# Patient Record
Sex: FEMALE | URBAN - METROPOLITAN AREA
[De-identification: names, ages, dates, MRNs, and addresses within clinical notes are randomized per-mention and may not be internally consistent; named-entity substitution may affect disease eponyms.]

---

## 2020-11-06 ENCOUNTER — OFFICE VISIT (OUTPATIENT)
Dept: URBAN - METROPOLITAN AREA CLINIC 68 | Facility: CLINIC | Age: 70
End: 2020-11-06
Payer: MEDICARE

## 2020-11-06 DIAGNOSIS — H25.12 AGE-RELATED NUCLEAR CATARACT, LEFT EYE: ICD-10-CM

## 2020-11-06 PROCEDURE — 92134 CPTRZ OPH DX IMG PST SGM RTA: CPT | Performed by: OPHTHALMOLOGY

## 2020-11-06 PROCEDURE — 92014 COMPRE OPH EXAM EST PT 1/>: CPT | Performed by: OPHTHALMOLOGY

## 2020-11-06 ASSESSMENT — INTRAOCULAR PRESSURE
OS: 20
OD: 19

## 2020-11-06 NOTE — IMPRESSION/PLAN
Impression: Age-related nuclear cataract, left eye: H25.12. Plan: Observe. D/w patient future cataract surgery at their discretion.

## 2020-11-06 NOTE — IMPRESSION/PLAN
Impression: Vitreous degeneration, bilateral: H43.813. Plan: No retinal breaks were identified on exam.  The findings were discussed with the patient. The signs and symptoms of a retinal tear and detachment were again reviewed with the patient. The patient was advised to return if they noted any new floaters, flashing lights or a shadow in their vision.

## 2020-11-06 NOTE — IMPRESSION/PLAN
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 OCT OU- improving IRF OD, No IRF/SRF OS, CMT /219
s/p AV OD 9/11/20
s/p focal OD 9/25/20 Plan: RVO component? Improving inferior fluid, fortunately threatening but not involving fovea. RBAC's of treat prn vs standing vs ext d/w patient --> elects prn = obs 1m OCT OU re-eval Avastin OD

## 2020-12-04 ENCOUNTER — OFFICE VISIT (OUTPATIENT)
Dept: URBAN - METROPOLITAN AREA CLINIC 68 | Facility: CLINIC | Age: 70
End: 2020-12-04
Payer: MEDICARE

## 2020-12-04 DIAGNOSIS — H43.813 VITREOUS DEGENERATION, BILATERAL: ICD-10-CM

## 2020-12-04 DIAGNOSIS — H40.1134 PRIMARY OPEN-ANGLE GLAUCOMA, BILATERAL, INDETERMINATE STAGE: ICD-10-CM

## 2020-12-04 PROCEDURE — 99213 OFFICE O/P EST LOW 20 MIN: CPT | Performed by: OPHTHALMOLOGY

## 2020-12-04 PROCEDURE — 99024 POSTOP FOLLOW-UP VISIT: CPT | Performed by: OPHTHALMOLOGY

## 2020-12-04 PROCEDURE — 92134 CPTRZ OPH DX IMG PST SGM RTA: CPT | Performed by: OPHTHALMOLOGY

## 2020-12-04 ASSESSMENT — INTRAOCULAR PRESSURE
OS: 20
OD: 24

## 2020-12-04 NOTE — IMPRESSION/PLAN
Impression: Primary open-angle glaucoma, bilateral, indeterminate stage: H40.1134. Plan: Family History and difference in c/d between OD and OS. Also borderline IOP. Rec eval.  D/w patient. Will refer.

## 2020-12-04 NOTE — IMPRESSION/PLAN
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 OCT OU- improving IRF OD, No IRF/SRF OS, CMT /219
s/p AV OD 9/11/20
s/p focal OD 9/25/20 Plan: RVO component? Improving/stable inferior fluid, fortunately threatening but not involving fovea. RBAC's of treat prn vs standing vs ext d/w patient --> elects prn = obs 2m OCT OU re-eval Avastin OD

## 2021-02-01 ENCOUNTER — OFFICE VISIT (OUTPATIENT)
Dept: URBAN - METROPOLITAN AREA CLINIC 71 | Facility: CLINIC | Age: 71
End: 2021-02-01
Payer: MEDICARE

## 2021-02-01 DIAGNOSIS — Z96.1 PRESENCE OF INTRAOCULAR LENS: ICD-10-CM

## 2021-02-01 DIAGNOSIS — E11.3313 TYPE 2 DIAB WITH MODERATE NONP RTNOP WITH MACULAR EDEMA, BI: ICD-10-CM

## 2021-02-01 DIAGNOSIS — H40.013 OPEN ANGLE WITH BORDERLINE FINDINGS, LOW RISK, BILATERAL: Primary | ICD-10-CM

## 2021-02-01 DIAGNOSIS — H26.491 OTHER SECONDARY CATARACT, RIGHT EYE: ICD-10-CM

## 2021-02-01 PROCEDURE — 92133 CPTRZD OPH DX IMG PST SGM ON: CPT | Performed by: OPHTHALMOLOGY

## 2021-02-01 PROCEDURE — 99203 OFFICE O/P NEW LOW 30 MIN: CPT | Performed by: OPHTHALMOLOGY

## 2021-02-01 ASSESSMENT — INTRAOCULAR PRESSURE
OS: 21
OD: 22

## 2021-02-01 NOTE — IMPRESSION/PLAN
Impression: Open angle with borderline findings, low risk, bilateral: H40.013. OCT ordered today. Plan: Patient is glaucoma suspect due to slightly elevated IOP, cupping of optic nerve and positive family history. Optic nerves appear healthy on OCT and physical exam. 
No recommendation for use of drops at this time. Will have patient return in 3 months for IOP and VF testing.

## 2021-02-01 NOTE — IMPRESSION/PLAN
Impression: Vitreous degeneration, bilateral: H43.813. Stable. No holes or tears seen today. Plan: Patient instructed to call the office immediately if any symptoms of retinal detachment occur.

## 2021-02-01 NOTE — IMPRESSION/PLAN
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  Plan: Keep appointments with Dr. No Lopes

## 2021-02-11 ENCOUNTER — OFFICE VISIT (OUTPATIENT)
Dept: URBAN - METROPOLITAN AREA CLINIC 68 | Facility: CLINIC | Age: 71
End: 2021-02-11
Payer: MEDICARE

## 2021-02-11 PROCEDURE — 92134 CPTRZ OPH DX IMG PST SGM RTA: CPT | Performed by: OPHTHALMOLOGY

## 2021-02-11 PROCEDURE — 99214 OFFICE O/P EST MOD 30 MIN: CPT | Performed by: OPHTHALMOLOGY

## 2021-02-11 ASSESSMENT — INTRAOCULAR PRESSURE
OD: 19
OS: 17

## 2021-02-11 NOTE — IMPRESSION/PLAN
Impression: Type 2 diab with moderate nonp rtnop with macular edema, bi: [id number]. OCT OU- inferior IRF OD, No IRF/SRF OS, CMT /216
s/p AV OD 9/11/20
s/p focal OD 9/25/20 Plan: RVO component? Improving/stable inferior fluid, fortunately threatening but not involving fovea. 

RBAC's of treat prn vs standing vs ext d/w patient --> elects prn = obs


3-4m OCT OU re-eval Avastin OD

## 2021-02-11 NOTE — IMPRESSION/PLAN
Impression: Primary open-angle glaucoma, bilateral, indeterminate stage: H40.1134. Plan: Family History and difference in c/d between OD and OS. Also borderline IOP. Rec eval.  D/w patient.   Followed by Dr. Oly Mackey

## 2021-05-21 ENCOUNTER — OFFICE VISIT (OUTPATIENT)
Dept: URBAN - METROPOLITAN AREA CLINIC 68 | Facility: CLINIC | Age: 71
End: 2021-05-21
Payer: MEDICARE

## 2021-05-21 PROCEDURE — 92134 CPTRZ OPH DX IMG PST SGM RTA: CPT | Performed by: OPHTHALMOLOGY

## 2021-05-21 PROCEDURE — 99213 OFFICE O/P EST LOW 20 MIN: CPT | Performed by: OPHTHALMOLOGY

## 2021-05-21 ASSESSMENT — INTRAOCULAR PRESSURE
OS: 13
OD: 20

## 2021-05-21 NOTE — IMPRESSION/PLAN
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 OCT OU- inferior and CI IRF OD, No IRF/SRF OS,  / 218 
s/p AV OD 9/11/20
s/p focal OD 9/25/20 Plan: RVO component? Stable inferior fluid, fortunately threatening but not involving fovea. RBAC's d/w patient = elects focal laser OD to help stabilize edema

focal laser OD within 1m then 3-4m OCT OU re-eval Avastin OD

## 2021-05-21 NOTE — IMPRESSION/PLAN
Impression: Primary open-angle glaucoma, bilateral, indeterminate stage: H40.1134. Plan: Family History and difference in c/d between OD and OS. Also borderline IOP. Rec eval.  D/w patient.   Followed by Dr. Antonia Reid

## 2021-07-16 ENCOUNTER — PROCEDURE (OUTPATIENT)
Dept: URBAN - METROPOLITAN AREA CLINIC 68 | Facility: CLINIC | Age: 71
End: 2021-07-16
Payer: MEDICARE

## 2021-07-16 PROCEDURE — 67210 TREATMENT OF RETINAL LESION: CPT | Performed by: OPHTHALMOLOGY

## 2021-07-16 ASSESSMENT — INTRAOCULAR PRESSURE
OD: 14
OS: 15

## 2021-10-22 ENCOUNTER — OFFICE VISIT (OUTPATIENT)
Dept: URBAN - METROPOLITAN AREA CLINIC 68 | Facility: CLINIC | Age: 71
End: 2021-10-22
Payer: MEDICARE

## 2021-10-22 PROCEDURE — 99214 OFFICE O/P EST MOD 30 MIN: CPT | Performed by: OPHTHALMOLOGY

## 2021-10-22 ASSESSMENT — INTRAOCULAR PRESSURE
OS: 17
OD: 23

## 2021-10-22 NOTE — IMPRESSION/PLAN
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 OCT OU (05/21/2021) - inferior and CI IRF OD, No IRF/SRF OS,  / 218 
s/p AV OD 9/11/20
s/p focal OD - 07/16/2021 Plan: OCT unavailable today. RVO component? Stable inferior fluid, fortunately threatening but not involving fovea. RBAC's d/w patient. Rec obs for now. May elects repeat focal laser OD in the future to help stabilize edema 3-4m OCT OU re-eval Avastin OD

## 2021-10-22 NOTE — IMPRESSION/PLAN
Impression: Primary open-angle glaucoma, bilateral, indeterminate stage: H40.1134. Plan: Family History and difference in c/d between OD and OS. Also borderline IOP. Rec eval.  D/w patient.   Followed by Dr. Kirsten Woodall

## 2022-01-13 ENCOUNTER — OFFICE VISIT (OUTPATIENT)
Dept: URBAN - METROPOLITAN AREA CLINIC 68 | Facility: CLINIC | Age: 72
End: 2022-01-13
Payer: MEDICARE

## 2022-01-13 PROCEDURE — 99214 OFFICE O/P EST MOD 30 MIN: CPT | Performed by: OPHTHALMOLOGY

## 2022-01-13 PROCEDURE — 67028 INJECTION EYE DRUG: CPT | Performed by: OPHTHALMOLOGY

## 2022-01-13 PROCEDURE — 92134 CPTRZ OPH DX IMG PST SGM RTA: CPT | Performed by: OPHTHALMOLOGY

## 2022-01-13 ASSESSMENT — INTRAOCULAR PRESSURE
OD: 17
OS: 16

## 2022-01-13 NOTE — IMPRESSION/PLAN
Impression: Type 2 diab with moderate nonp rtnop with macular edema, bi: [id number]. OCT OU (05/21/2021) - inferior and CI IRF OD, No IRF/SRF OS,  / 227 
s/p AV OD 9/11/20
s/p focal OD - 07/16/2021 Plan: RVO component? Stable inferior fluid, threatening but fortunately not involving fovea. Could try monthly series of Avastin x 3 to see if there is potential for improved VA. Could also obs as she is stable. RBAC's d/w patient. Discussed R,B,A of Avastin vs Lucentis vs Eylea vs Beovu injection. Discussed no FDA approval with Avastin and compounding risk. Discussed signs and symptoms of inflammation, endophthalmitis, vitreous hemorrhage, retinal tear, retinal detachment. Patient understands and wishes to proceed with Avastin injection today. Timeout was performed before procedure. AVASTIN INJECTION COMPLETED TODAY as per protocol without complications. 

1m OCT/Avastin OD #2/3

## 2022-01-13 NOTE — IMPRESSION/PLAN
Impression: Primary open-angle glaucoma, bilateral, indeterminate stage: H40.1134. Plan: Family History and difference in c/d between OD and OS. Also borderline IOP. Rec eval.  D/w patient.   Followed by Dr. Horacio Sanchez

## 2022-02-10 ENCOUNTER — PROCEDURE (OUTPATIENT)
Dept: URBAN - METROPOLITAN AREA CLINIC 68 | Facility: CLINIC | Age: 72
End: 2022-02-10
Payer: MEDICARE

## 2022-02-10 PROCEDURE — 92134 CPTRZ OPH DX IMG PST SGM RTA: CPT | Performed by: OPHTHALMOLOGY

## 2022-02-10 PROCEDURE — 67028 INJECTION EYE DRUG: CPT | Performed by: OPHTHALMOLOGY

## 2022-02-10 ASSESSMENT — INTRAOCULAR PRESSURE
OD: 20
OS: 20

## 2022-03-10 ENCOUNTER — PROCEDURE (OUTPATIENT)
Dept: URBAN - METROPOLITAN AREA CLINIC 68 | Facility: CLINIC | Age: 72
End: 2022-03-10
Payer: MEDICARE

## 2022-03-10 PROCEDURE — 92134 CPTRZ OPH DX IMG PST SGM RTA: CPT | Performed by: OPHTHALMOLOGY

## 2022-03-10 PROCEDURE — 67028 INJECTION EYE DRUG: CPT | Performed by: OPHTHALMOLOGY

## 2022-03-10 ASSESSMENT — INTRAOCULAR PRESSURE
OD: 20
OS: 18

## 2022-04-22 ENCOUNTER — OFFICE VISIT (OUTPATIENT)
Dept: URBAN - METROPOLITAN AREA CLINIC 68 | Facility: CLINIC | Age: 72
End: 2022-04-22
Payer: MEDICARE

## 2022-04-22 DIAGNOSIS — E11.3313 TYPE 2 DIAB WITH MODERATE NONP RTNOP WITH MACULAR EDEMA, BI: Primary | ICD-10-CM

## 2022-04-22 DIAGNOSIS — H40.1134 PRIMARY OPEN-ANGLE GLAUCOMA, BILATERAL, INDETERMINATE STAGE: ICD-10-CM

## 2022-04-22 DIAGNOSIS — Z96.1 PRESENCE OF INTRAOCULAR LENS: ICD-10-CM

## 2022-04-22 DIAGNOSIS — H43.813 VITREOUS DEGENERATION, BILATERAL: ICD-10-CM

## 2022-04-22 PROCEDURE — 92134 CPTRZ OPH DX IMG PST SGM RTA: CPT | Performed by: OPHTHALMOLOGY

## 2022-04-22 PROCEDURE — 99213 OFFICE O/P EST LOW 20 MIN: CPT | Performed by: OPHTHALMOLOGY

## 2022-04-22 ASSESSMENT — INTRAOCULAR PRESSURE
OS: 20
OD: 19

## 2022-04-22 NOTE — IMPRESSION/PLAN
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 OCT OU (05/21/2021) - resolved inferior IRF OD, No IRF/SRF OS,  / 219
s/p Avastin OD - 03/10/2022
s/p focal OD - 07/16/2021 Plan: RVO component? Resolved after monthly series of Avastin x 3. No appreciable symptomatic improvement. RBA's d/w patient. She elects obs. 

3m OCT OU re-eval Avastin OD

## 2022-04-22 NOTE — IMPRESSION/PLAN
Impression: Primary open-angle glaucoma, bilateral, indeterminate stage: H40.1134. Plan: Family History and difference in c/d between OD and OS. Also borderline IOP. Rec eval.  D/w patient.   Followed by Dr. Barb Sanchez

## 2022-08-11 ENCOUNTER — OFFICE VISIT (OUTPATIENT)
Facility: LOCATION | Age: 72
End: 2022-08-11
Payer: MEDICARE

## 2022-08-11 DIAGNOSIS — H40.1134 PRIMARY OPEN-ANGLE GLAUCOMA, BILATERAL, INDETERMINATE STAGE: ICD-10-CM

## 2022-08-11 DIAGNOSIS — E11.3313 TYPE 2 DIAB WITH MODERATE NONP RTNOP WITH MACULAR EDEMA, BI: Primary | ICD-10-CM

## 2022-08-11 DIAGNOSIS — H43.813 VITREOUS DEGENERATION, BILATERAL: ICD-10-CM

## 2022-08-11 DIAGNOSIS — Z96.1 PRESENCE OF INTRAOCULAR LENS: ICD-10-CM

## 2022-08-11 PROCEDURE — 92134 CPTRZ OPH DX IMG PST SGM RTA: CPT | Performed by: OPHTHALMOLOGY

## 2022-08-11 PROCEDURE — 67028 INJECTION EYE DRUG: CPT | Performed by: OPHTHALMOLOGY

## 2022-08-11 PROCEDURE — 99214 OFFICE O/P EST MOD 30 MIN: CPT | Performed by: OPHTHALMOLOGY

## 2022-08-11 ASSESSMENT — INTRAOCULAR PRESSURE
OD: 14
OS: 18

## 2022-08-11 NOTE — IMPRESSION/PLAN
Impression: Primary open-angle glaucoma, bilateral, indeterminate stage: H40.1134. Plan: Family History and difference in c/d between OD and OS. Also borderline IOP. Rec eval.  D/w patient.   Followed by Dr. Ivan Freeman

## 2022-08-11 NOTE — IMPRESSION/PLAN
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 OCT OU (05/21/2021) - renewed IRF OD, No IRF/SRF OS,  / 340
s/p Avastin OD - 03/10/2022
s/p focal OD - 07/16/2021 Plan: RVO component? Was resolved after monthly series of Avastin x 3 with no appreciable symptomatic improvement. Increased fluid and worsened vision at q4m s/p last treatment. Rec monthly series with hopes of T&E. D/w patient in detail. Discussed R,B,A of Avastin vs Lucentis vs Eylea vs Beovu vs Vabysmo injection. Discussed no FDA approval with Avastin and compounding risk. Discussed signs and symptoms of inflammation, endophthalmitis, vitreous hemorrhage, retinal tear, retinal detachment. Patient understands and wishes to proceed with Avastin injection today. Timeout was performed before procedure. AVASTIN INJECTION COMPLETED TODAY as per protocol without complications. 

1m OCT OU Avastin OD 2/3

## 2022-09-22 ENCOUNTER — PROCEDURE (OUTPATIENT)
Facility: LOCATION | Age: 72
End: 2022-09-22
Payer: MEDICARE

## 2022-09-22 DIAGNOSIS — E11.3313 TYPE 2 DIABETES MELLITUS WITH MODERATE NONPROLIFERATIVE DIABETIC RETINOPATHY WITH MACULAR EDEMA, BILATERAL: Primary | ICD-10-CM

## 2022-09-22 PROCEDURE — 67028 INJECTION EYE DRUG: CPT | Performed by: OPHTHALMOLOGY

## 2022-09-22 PROCEDURE — 92134 CPTRZ OPH DX IMG PST SGM RTA: CPT | Performed by: OPHTHALMOLOGY

## 2022-09-22 ASSESSMENT — INTRAOCULAR PRESSURE
OD: 25
OS: 21

## 2022-10-27 ENCOUNTER — PROCEDURE (OUTPATIENT)
Facility: LOCATION | Age: 72
End: 2022-10-27
Payer: MEDICARE

## 2022-10-27 DIAGNOSIS — E11.3313 TYPE 2 DIABETES MELLITUS WITH MODERATE NONPROLIFERATIVE DIABETIC RETINOPATHY WITH MACULAR EDEMA, BILATERAL: Primary | ICD-10-CM

## 2022-10-27 PROCEDURE — 67028 INJECTION EYE DRUG: CPT | Performed by: OPHTHALMOLOGY

## 2022-10-27 PROCEDURE — 92134 CPTRZ OPH DX IMG PST SGM RTA: CPT | Performed by: OPHTHALMOLOGY

## 2022-10-27 ASSESSMENT — INTRAOCULAR PRESSURE
OS: 14
OD: 18

## 2022-12-22 ENCOUNTER — OFFICE VISIT (OUTPATIENT)
Facility: LOCATION | Age: 72
End: 2022-12-22
Payer: MEDICARE

## 2022-12-22 DIAGNOSIS — H43.813 VITREOUS DEGENERATION, BILATERAL: ICD-10-CM

## 2022-12-22 DIAGNOSIS — Z96.1 PRESENCE OF INTRAOCULAR LENS: ICD-10-CM

## 2022-12-22 DIAGNOSIS — E11.3313 TYPE 2 DIAB WITH MODERATE NONP RTNOP WITH MACULAR EDEMA, BI: Primary | ICD-10-CM

## 2022-12-22 DIAGNOSIS — H40.1134 PRIMARY OPEN-ANGLE GLAUCOMA, BILATERAL, INDETERMINATE STAGE: ICD-10-CM

## 2022-12-22 PROCEDURE — 99214 OFFICE O/P EST MOD 30 MIN: CPT | Performed by: OPHTHALMOLOGY

## 2022-12-22 PROCEDURE — 92134 CPTRZ OPH DX IMG PST SGM RTA: CPT | Performed by: OPHTHALMOLOGY

## 2022-12-22 PROCEDURE — 67028 INJECTION EYE DRUG: CPT | Performed by: OPHTHALMOLOGY

## 2022-12-22 ASSESSMENT — INTRAOCULAR PRESSURE
OD: 17
OS: 18

## 2022-12-22 NOTE — IMPRESSION/PLAN
Impression: Primary open-angle glaucoma, bilateral, indeterminate stage: H40.1134. Plan: Family History and difference in c/d between OD and OS. Also borderline IOP. Rec eval.  D/w patient.   Followed by Dr. Shaver Kem

## 2022-12-22 NOTE — IMPRESSION/PLAN
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 OCT OU (05/21/2021) - renewed IRF OD, No IRF/SRF OS  / 238
s/p Avastin OD - 10/27/2022
s/p focal OD - 07/16/2021 Plan: RVO component? Was resolved after monthly series of Avastin x 3 with no appreciable symptomatic improvement. Increased fluid and worsened vision at q4m s/p last treatment. Rec monthly series with hopes of T&E. D/w patient in detail. Discussed R,B,A of Avastin vs Lucentis vs Eylea vs Beovu vs Vabysmo injection. Discussed no FDA approval with Avastin and compounding risk. Discussed signs and symptoms of inflammation, endophthalmitis, vitreous hemorrhage, retinal tear, retinal detachment. Patient understands and wishes to proceed with Avastin injection today. Timeout was performed before procedure. AVASTIN INJECTION COMPLETED TODAY as per protocol without complications. 

1m OCT OU Avastin OD 2/3

## 2023-01-26 ENCOUNTER — PROCEDURE (OUTPATIENT)
Facility: LOCATION | Age: 73
End: 2023-01-26
Payer: MEDICARE

## 2023-01-26 DIAGNOSIS — E11.3313 TYPE 2 DIABETES MELLITUS WITH MODERATE NONPROLIFERATIVE DIABETIC RETINOPATHY WITH MACULAR EDEMA, BILATERAL: Primary | ICD-10-CM

## 2023-01-26 PROCEDURE — 92134 CPTRZ OPH DX IMG PST SGM RTA: CPT | Performed by: OPHTHALMOLOGY

## 2023-01-26 PROCEDURE — 67028 INJECTION EYE DRUG: CPT | Performed by: OPHTHALMOLOGY

## 2023-01-26 ASSESSMENT — INTRAOCULAR PRESSURE
OD: 21
OS: 15

## 2023-02-23 ENCOUNTER — PROCEDURE (OUTPATIENT)
Facility: LOCATION | Age: 73
End: 2023-02-23
Payer: MEDICARE

## 2023-02-23 DIAGNOSIS — E11.3313 TYPE 2 DIABETES MELLITUS WITH MODERATE NONPROLIFERATIVE DIABETIC RETINOPATHY WITH MACULAR EDEMA, BILATERAL: Primary | ICD-10-CM

## 2023-02-23 PROCEDURE — 92134 CPTRZ OPH DX IMG PST SGM RTA: CPT | Performed by: OPHTHALMOLOGY

## 2023-02-23 PROCEDURE — 67028 INJECTION EYE DRUG: CPT | Performed by: OPHTHALMOLOGY

## 2023-02-23 ASSESSMENT — INTRAOCULAR PRESSURE
OS: 18
OD: 17

## 2023-03-30 ENCOUNTER — OFFICE VISIT (OUTPATIENT)
Facility: LOCATION | Age: 73
End: 2023-03-30
Payer: MEDICARE

## 2023-03-30 DIAGNOSIS — H43.813 VITREOUS DEGENERATION, BILATERAL: ICD-10-CM

## 2023-03-30 DIAGNOSIS — H40.1134 PRIMARY OPEN-ANGLE GLAUCOMA, BILATERAL, INDETERMINATE STAGE: ICD-10-CM

## 2023-03-30 DIAGNOSIS — E11.3313 TYPE 2 DIAB WITH MODERATE NONP RTNOP WITH MACULAR EDEMA, BI: Primary | ICD-10-CM

## 2023-03-30 DIAGNOSIS — Z96.1 PRESENCE OF INTRAOCULAR LENS: ICD-10-CM

## 2023-03-30 PROCEDURE — 99214 OFFICE O/P EST MOD 30 MIN: CPT | Performed by: OPHTHALMOLOGY

## 2023-03-30 PROCEDURE — 67028 INJECTION EYE DRUG: CPT | Performed by: OPHTHALMOLOGY

## 2023-03-30 PROCEDURE — 92134 CPTRZ OPH DX IMG PST SGM RTA: CPT | Performed by: OPHTHALMOLOGY

## 2023-03-30 ASSESSMENT — INTRAOCULAR PRESSURE
OD: 19
OS: 21

## 2023-03-30 NOTE — IMPRESSION/PLAN
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 OCT OU (05/21/2021) - much improved IRF OD, No IRF/SRF OS  / 231
s/p Avastin OD - 02/23/2023
s/p focal OD - 07/16/2021 Plan: RVO component? Was resolved after monthly series of Avastin x 3 with no appreciable symptomatic improvement. Increased fluid and worsened vision at 4m s/p last treatment. Rec T&E as series. Consider 2m interval. D/w patient in detail. She elects 1-2m interval for now. Discussed R,B,A of Avastin vs Lucentis vs Eylea vs Beovu vs Vabysmo injection. Discussed no FDA approval with Avastin and compounding risk. Discussed signs and symptoms of inflammation, endophthalmitis, vitreous hemorrhage, retinal tear, retinal detachment. Patient understands and wishes to proceed with Avastin injection today. Timeout was performed before procedure. AVASTIN INJECTION COMPLETED TODAY as per protocol without complications. 

1-2m OCT OU Avastin OD 2/6

## 2023-03-30 NOTE — IMPRESSION/PLAN
Impression: Primary open-angle glaucoma, bilateral, indeterminate stage: H40.1134. Plan: Family History and difference in c/d between OD and OS. Also borderline IOP. Rec eval.  D/w patient.   Followed by Dr. Casper Linda

## 2023-05-11 ENCOUNTER — PROCEDURE (OUTPATIENT)
Facility: LOCATION | Age: 73
End: 2023-05-11
Payer: MEDICARE

## 2023-05-11 DIAGNOSIS — E11.3313 TYPE 2 DIABETES MELLITUS WITH MODERATE NONPROLIFERATIVE DIABETIC RETINOPATHY WITH MACULAR EDEMA, BILATERAL: Primary | ICD-10-CM

## 2023-05-11 PROCEDURE — 67028 INJECTION EYE DRUG: CPT | Performed by: OPHTHALMOLOGY

## 2023-05-11 PROCEDURE — 92134 CPTRZ OPH DX IMG PST SGM RTA: CPT | Performed by: OPHTHALMOLOGY

## 2023-05-11 ASSESSMENT — INTRAOCULAR PRESSURE
OS: 19
OD: 18

## 2023-06-08 ENCOUNTER — PROCEDURE (OUTPATIENT)
Facility: LOCATION | Age: 73
End: 2023-06-08
Payer: MEDICARE

## 2023-06-08 DIAGNOSIS — E11.3313 TYPE 2 DIABETES MELLITUS WITH MODERATE NONPROLIFERATIVE DIABETIC RETINOPATHY WITH MACULAR EDEMA, BILATERAL: Primary | ICD-10-CM

## 2023-06-08 PROCEDURE — 92134 CPTRZ OPH DX IMG PST SGM RTA: CPT | Performed by: OPHTHALMOLOGY

## 2023-06-08 PROCEDURE — 67028 INJECTION EYE DRUG: CPT | Performed by: OPHTHALMOLOGY

## 2023-06-08 ASSESSMENT — INTRAOCULAR PRESSURE
OD: 18
OS: 19

## 2023-07-27 ENCOUNTER — PROCEDURE (OUTPATIENT)
Facility: LOCATION | Age: 73
End: 2023-07-27
Payer: MEDICARE

## 2023-07-27 DIAGNOSIS — E11.3313 TYPE 2 DIABETES MELLITUS WITH MODERATE NONPROLIFERATIVE DIABETIC RETINOPATHY WITH MACULAR EDEMA, BILATERAL: Primary | ICD-10-CM

## 2023-07-27 PROCEDURE — 67028 INJECTION EYE DRUG: CPT | Performed by: OPHTHALMOLOGY

## 2023-07-27 PROCEDURE — 92134 CPTRZ OPH DX IMG PST SGM RTA: CPT | Performed by: OPHTHALMOLOGY

## 2023-07-27 ASSESSMENT — INTRAOCULAR PRESSURE
OD: 19
OS: 18

## 2023-08-31 ENCOUNTER — PROCEDURE (OUTPATIENT)
Facility: LOCATION | Age: 73
End: 2023-08-31
Payer: MEDICARE

## 2023-08-31 DIAGNOSIS — E11.3313 TYPE 2 DIABETES MELLITUS WITH MODERATE NONPROLIFERATIVE DIABETIC RETINOPATHY WITH MACULAR EDEMA, BILATERAL: Primary | ICD-10-CM

## 2023-08-31 PROCEDURE — 92134 CPTRZ OPH DX IMG PST SGM RTA: CPT | Performed by: OPHTHALMOLOGY

## 2023-08-31 PROCEDURE — 67028 INJECTION EYE DRUG: CPT | Performed by: OPHTHALMOLOGY

## 2023-08-31 ASSESSMENT — INTRAOCULAR PRESSURE
OS: 22
OD: 21

## 2023-10-26 ENCOUNTER — OFFICE VISIT (OUTPATIENT)
Facility: LOCATION | Age: 73
End: 2023-10-26
Payer: MEDICARE

## 2023-10-26 DIAGNOSIS — E11.3313 TYPE 2 DIABETES MELLITUS WITH MODERATE NONPROLIFERATIVE DIABETIC RETINOPATHY WITH MACULAR EDEMA, BILATERAL: Primary | ICD-10-CM

## 2023-10-26 PROCEDURE — 92134 CPTRZ OPH DX IMG PST SGM RTA: CPT | Performed by: OPHTHALMOLOGY

## 2023-10-26 PROCEDURE — 67028 INJECTION EYE DRUG: CPT | Performed by: OPHTHALMOLOGY

## 2023-10-26 ASSESSMENT — INTRAOCULAR PRESSURE
OS: 14
OD: 19

## 2023-12-28 ENCOUNTER — OFFICE VISIT (OUTPATIENT)
Facility: LOCATION | Age: 73
End: 2023-12-28
Payer: MEDICARE

## 2023-12-28 DIAGNOSIS — Z96.1 PRESENCE OF INTRAOCULAR LENS: ICD-10-CM

## 2023-12-28 DIAGNOSIS — H40.1134 PRIMARY OPEN-ANGLE GLAUCOMA, BILATERAL, INDETERMINATE STAGE: ICD-10-CM

## 2023-12-28 DIAGNOSIS — E11.3313 TYPE 2 DIAB WITH MODERATE NONP RTNOP WITH MACULAR EDEMA, BI: Primary | ICD-10-CM

## 2023-12-28 DIAGNOSIS — H43.813 VITREOUS DEGENERATION, BILATERAL: ICD-10-CM

## 2023-12-28 PROCEDURE — 92134 CPTRZ OPH DX IMG PST SGM RTA: CPT | Performed by: OPHTHALMOLOGY

## 2023-12-28 PROCEDURE — 67028 INJECTION EYE DRUG: CPT | Performed by: OPHTHALMOLOGY

## 2023-12-28 PROCEDURE — 99214 OFFICE O/P EST MOD 30 MIN: CPT | Performed by: OPHTHALMOLOGY

## 2023-12-28 ASSESSMENT — INTRAOCULAR PRESSURE
OD: 20
OS: 18

## 2024-04-02 ENCOUNTER — OFFICE VISIT (OUTPATIENT)
Facility: LOCATION | Age: 74
End: 2024-04-02
Payer: MEDICARE

## 2024-04-02 DIAGNOSIS — E11.3313 TYPE 2 DIABETES MELLITUS WITH MODERATE NONPROLIFERATIVE DIABETIC RETINOPATHY WITH MACULAR EDEMA, BILATERAL: Primary | ICD-10-CM

## 2024-04-02 PROCEDURE — 92134 CPTRZ OPH DX IMG PST SGM RTA: CPT | Performed by: STUDENT IN AN ORGANIZED HEALTH CARE EDUCATION/TRAINING PROGRAM

## 2024-04-02 PROCEDURE — 67028 INJECTION EYE DRUG: CPT | Performed by: STUDENT IN AN ORGANIZED HEALTH CARE EDUCATION/TRAINING PROGRAM

## 2024-04-02 ASSESSMENT — INTRAOCULAR PRESSURE
OS: 19
OD: 19

## 2024-05-28 ENCOUNTER — OFFICE VISIT (OUTPATIENT)
Facility: LOCATION | Age: 74
End: 2024-05-28
Payer: MEDICARE

## 2024-05-28 DIAGNOSIS — E11.3313 TYPE 2 DIABETES MELLITUS WITH MODERATE NONPROLIFERATIVE DIABETIC RETINOPATHY WITH MACULAR EDEMA, BILATERAL: Primary | ICD-10-CM

## 2024-05-28 PROCEDURE — 67028 INJECTION EYE DRUG: CPT | Performed by: STUDENT IN AN ORGANIZED HEALTH CARE EDUCATION/TRAINING PROGRAM

## 2024-05-28 PROCEDURE — 92134 CPTRZ OPH DX IMG PST SGM RTA: CPT | Performed by: STUDENT IN AN ORGANIZED HEALTH CARE EDUCATION/TRAINING PROGRAM

## 2024-05-28 ASSESSMENT — INTRAOCULAR PRESSURE
OS: 14
OD: 19

## 2024-07-23 ENCOUNTER — OFFICE VISIT (OUTPATIENT)
Facility: LOCATION | Age: 74
End: 2024-07-23
Payer: MEDICARE

## 2024-07-23 DIAGNOSIS — Z96.1 PRESENCE OF INTRAOCULAR LENS: ICD-10-CM

## 2024-07-23 DIAGNOSIS — H25.812 COMBINED FORMS OF AGE-RELATED CATARACT, LEFT EYE: ICD-10-CM

## 2024-07-23 DIAGNOSIS — E11.3313 TYPE 2 DIAB WITH MODERATE NONP RTNOP WITH MACULAR EDEMA, BI: Primary | ICD-10-CM

## 2024-07-23 DIAGNOSIS — H43.813 VITREOUS DEGENERATION, BILATERAL: ICD-10-CM

## 2024-07-23 DIAGNOSIS — H40.1134 PRIMARY OPEN-ANGLE GLAUCOMA, BILATERAL, INDETERMINATE STAGE: ICD-10-CM

## 2024-07-23 PROCEDURE — 99214 OFFICE O/P EST MOD 30 MIN: CPT | Performed by: STUDENT IN AN ORGANIZED HEALTH CARE EDUCATION/TRAINING PROGRAM

## 2024-07-23 PROCEDURE — 92134 CPTRZ OPH DX IMG PST SGM RTA: CPT | Performed by: STUDENT IN AN ORGANIZED HEALTH CARE EDUCATION/TRAINING PROGRAM

## 2024-07-23 PROCEDURE — 67028 INJECTION EYE DRUG: CPT | Performed by: STUDENT IN AN ORGANIZED HEALTH CARE EDUCATION/TRAINING PROGRAM

## 2024-07-23 ASSESSMENT — INTRAOCULAR PRESSURE
OD: 10
OS: 9

## 2024-10-01 ENCOUNTER — OFFICE VISIT (OUTPATIENT)
Facility: LOCATION | Age: 74
End: 2024-10-01
Payer: MEDICARE

## 2024-10-01 DIAGNOSIS — E11.3313 TYPE 2 DIABETES MELLITUS WITH MODERATE NONPROLIFERATIVE DIABETIC RETINOPATHY WITH MACULAR EDEMA, BILATERAL: Primary | ICD-10-CM

## 2024-10-01 PROCEDURE — 67028 INJECTION EYE DRUG: CPT | Performed by: STUDENT IN AN ORGANIZED HEALTH CARE EDUCATION/TRAINING PROGRAM

## 2024-10-01 PROCEDURE — 92134 CPTRZ OPH DX IMG PST SGM RTA: CPT | Performed by: STUDENT IN AN ORGANIZED HEALTH CARE EDUCATION/TRAINING PROGRAM

## 2024-10-01 ASSESSMENT — INTRAOCULAR PRESSURE
OS: 15
OD: 15

## 2024-11-20 ENCOUNTER — OFFICE VISIT (OUTPATIENT)
Dept: URBAN - METROPOLITAN AREA CLINIC 71 | Facility: CLINIC | Age: 74
End: 2024-11-20
Payer: MEDICARE

## 2024-11-20 DIAGNOSIS — H25.812 COMBINED FORMS OF AGE-RELATED CATARACT, LEFT EYE: ICD-10-CM

## 2024-11-20 DIAGNOSIS — H52.4 PRESBYOPIA: ICD-10-CM

## 2024-11-20 DIAGNOSIS — H40.013 OPEN ANGLE WITH BORDERLINE FINDINGS, LOW RISK, BILATERAL: Primary | ICD-10-CM

## 2024-11-20 DIAGNOSIS — E11.3313 TYPE 2 DIAB WITH MODERATE NONP RTNOP WITH MACULAR EDEMA, BI: ICD-10-CM

## 2024-11-20 PROCEDURE — 92133 CPTRZD OPH DX IMG PST SGM ON: CPT | Performed by: OPTOMETRIST

## 2024-11-20 PROCEDURE — 92015 DETERMINE REFRACTIVE STATE: CPT | Performed by: OPTOMETRIST

## 2024-11-20 PROCEDURE — 92134 CPTRZ OPH DX IMG PST SGM RTA: CPT | Performed by: OPTOMETRIST

## 2024-11-20 PROCEDURE — 99204 OFFICE O/P NEW MOD 45 MIN: CPT | Performed by: OPTOMETRIST

## 2024-11-20 ASSESSMENT — INTRAOCULAR PRESSURE
OD: 15
OS: 12

## 2024-11-20 ASSESSMENT — VISUAL ACUITY
OS: 20/100
OD: 20/150

## 2024-12-04 ENCOUNTER — TECH ONLY (OUTPATIENT)
Dept: URBAN - METROPOLITAN AREA CLINIC 71 | Facility: CLINIC | Age: 74
End: 2024-12-04
Payer: MEDICARE

## 2024-12-06 ENCOUNTER — OFFICE VISIT (OUTPATIENT)
Dept: URBAN - METROPOLITAN AREA CLINIC 71 | Facility: CLINIC | Age: 74
End: 2024-12-06
Payer: MEDICARE

## 2024-12-06 DIAGNOSIS — G61.0 GUILLAIN-BARRE SYNDROME: ICD-10-CM

## 2024-12-06 DIAGNOSIS — H40.013 OPEN ANGLE WITH BORDERLINE FINDINGS, LOW RISK, BILATERAL: ICD-10-CM

## 2024-12-06 DIAGNOSIS — E11.3551 TYPE 2 DIABETES WITH STABLE PROLIF DIABETIC RTNOP, RIGHT EYE: ICD-10-CM

## 2024-12-06 DIAGNOSIS — E11.9 TYPE 2 DIABETES MELLITUS W/O COMPLICATION: ICD-10-CM

## 2024-12-06 DIAGNOSIS — H43.813 VITREOUS DEGENERATION, BILATERAL: ICD-10-CM

## 2024-12-06 DIAGNOSIS — H25.812 COMBINED FORMS OF AGE-RELATED CATARACT, LEFT EYE: Primary | ICD-10-CM

## 2024-12-06 DIAGNOSIS — Z96.1 PRESENCE OF INTRAOCULAR LENS: ICD-10-CM

## 2024-12-06 PROCEDURE — 99204 OFFICE O/P NEW MOD 45 MIN: CPT | Performed by: OPHTHALMOLOGY

## 2024-12-06 RX ORDER — KETOROLAC TROMETHAMINE 5 MG/ML
0.5 % SOLUTION OPHTHALMIC
Qty: 5 | Refills: 0 | Status: ACTIVE
Start: 2024-12-06

## 2024-12-06 RX ORDER — CIPROFLOXACIN HYDROCHLORIDE 3 MG/ML
0.3 % SOLUTION OPHTHALMIC
Qty: 5 | Refills: 0 | Status: INACTIVE
Start: 2024-12-06 | End: 2024-12-07

## 2024-12-06 ASSESSMENT — INTRAOCULAR PRESSURE
OD: 16
OS: 14

## 2024-12-06 ASSESSMENT — VISUAL ACUITY
OD: 20/150
OS: 20/100

## 2024-12-10 ENCOUNTER — OFFICE VISIT (OUTPATIENT)
Facility: LOCATION | Age: 74
End: 2024-12-10
Payer: MEDICARE

## 2024-12-10 DIAGNOSIS — E11.3551 TYPE 2 DIABETES MELLITUS WITH STABLE PROLIFERATIVE DIABETIC RETINOPATHY, RIGHT EYE: Primary | ICD-10-CM

## 2024-12-10 PROCEDURE — 67028 INJECTION EYE DRUG: CPT | Performed by: STUDENT IN AN ORGANIZED HEALTH CARE EDUCATION/TRAINING PROGRAM

## 2024-12-10 PROCEDURE — 92134 CPTRZ OPH DX IMG PST SGM RTA: CPT | Performed by: STUDENT IN AN ORGANIZED HEALTH CARE EDUCATION/TRAINING PROGRAM

## 2024-12-10 ASSESSMENT — INTRAOCULAR PRESSURE
OD: 15
OS: 19

## 2025-01-15 ENCOUNTER — SURGERY (OUTPATIENT)
Dept: URBAN - METROPOLITAN AREA SURGERY 44 | Facility: SURGERY | Age: 75
End: 2025-01-15
Payer: MEDICARE

## 2025-01-15 ENCOUNTER — SURGERY (OUTPATIENT)
Dept: URBAN - METROPOLITAN AREA SURGERY 45 | Facility: SURGERY | Age: 75
End: 2025-01-15
Payer: MEDICARE

## 2025-01-15 PROCEDURE — PR1FY PR1FY: CUSTOM | Performed by: OPHTHALMOLOGY

## 2025-01-15 PROCEDURE — 66984 XCAPSL CTRC RMVL W/O ECP: CPT | Performed by: OPHTHALMOLOGY

## 2025-01-16 ENCOUNTER — POST-OPERATIVE VISIT (OUTPATIENT)
Dept: URBAN - METROPOLITAN AREA CLINIC 71 | Facility: CLINIC | Age: 75
End: 2025-01-16
Payer: MEDICARE

## 2025-01-16 DIAGNOSIS — Z96.1 PRESENCE OF INTRAOCULAR LENS: Primary | ICD-10-CM

## 2025-01-16 PROCEDURE — 99024 POSTOP FOLLOW-UP VISIT: CPT

## 2025-01-16 ASSESSMENT — INTRAOCULAR PRESSURE
OD: 17
OS: 14

## 2025-01-22 ENCOUNTER — POST-OPERATIVE VISIT (OUTPATIENT)
Dept: URBAN - METROPOLITAN AREA CLINIC 71 | Facility: CLINIC | Age: 75
End: 2025-01-22
Payer: MEDICARE

## 2025-01-22 DIAGNOSIS — Z96.1 PRESENCE OF INTRAOCULAR LENS: Primary | ICD-10-CM

## 2025-01-22 ASSESSMENT — INTRAOCULAR PRESSURE
OD: 19
OS: 17

## 2025-02-18 ENCOUNTER — OFFICE VISIT (OUTPATIENT)
Facility: LOCATION | Age: 75
End: 2025-02-18
Payer: MEDICARE

## 2025-02-18 DIAGNOSIS — E11.3551 TYPE 2 DIABETES MELLITUS WITH STABLE PROLIFERATIVE DIABETIC RETINOPATHY, RIGHT EYE: Primary | ICD-10-CM

## 2025-02-18 PROCEDURE — 92134 CPTRZ OPH DX IMG PST SGM RTA: CPT | Performed by: STUDENT IN AN ORGANIZED HEALTH CARE EDUCATION/TRAINING PROGRAM

## 2025-02-18 PROCEDURE — 67028 INJECTION EYE DRUG: CPT | Performed by: STUDENT IN AN ORGANIZED HEALTH CARE EDUCATION/TRAINING PROGRAM

## 2025-02-18 ASSESSMENT — INTRAOCULAR PRESSURE
OD: 15
OS: 15

## 2025-02-19 ENCOUNTER — POST-OPERATIVE VISIT (OUTPATIENT)
Dept: URBAN - METROPOLITAN AREA CLINIC 71 | Facility: CLINIC | Age: 75
End: 2025-02-19
Payer: MEDICARE

## 2025-02-19 DIAGNOSIS — Z96.1 PRESENCE OF INTRAOCULAR LENS: ICD-10-CM

## 2025-02-19 DIAGNOSIS — H52.4 PRESBYOPIA: Primary | ICD-10-CM

## 2025-02-19 PROCEDURE — 99024 POSTOP FOLLOW-UP VISIT: CPT

## 2025-02-19 ASSESSMENT — INTRAOCULAR PRESSURE
OS: 14
OD: 16

## 2025-02-19 ASSESSMENT — VISUAL ACUITY
OD: 20/200
OS: 20/25

## 2025-05-20 ENCOUNTER — OFFICE VISIT (OUTPATIENT)
Facility: LOCATION | Age: 75
End: 2025-05-20
Payer: MEDICARE

## 2025-05-20 DIAGNOSIS — H25.812 COMBINED FORMS OF AGE-RELATED CATARACT, LEFT EYE: ICD-10-CM

## 2025-05-20 DIAGNOSIS — E11.3313 TYPE 2 DIAB WITH MODERATE NONP RTNOP WITH MACULAR EDEMA, BI: ICD-10-CM

## 2025-05-20 DIAGNOSIS — H43.813 VITREOUS DEGENERATION, BILATERAL: ICD-10-CM

## 2025-05-20 DIAGNOSIS — Z96.1 PRESENCE OF INTRAOCULAR LENS: ICD-10-CM

## 2025-05-20 DIAGNOSIS — E11.3551 TYPE 2 DIABETES MELLITUS WITH STABLE PROLIFERATIVE DIABETIC RETINOPATHY, RIGHT EYE: Primary | ICD-10-CM

## 2025-05-20 PROCEDURE — 99214 OFFICE O/P EST MOD 30 MIN: CPT | Performed by: STUDENT IN AN ORGANIZED HEALTH CARE EDUCATION/TRAINING PROGRAM

## 2025-05-20 PROCEDURE — 67028 INJECTION EYE DRUG: CPT | Performed by: STUDENT IN AN ORGANIZED HEALTH CARE EDUCATION/TRAINING PROGRAM

## 2025-05-20 PROCEDURE — 92134 CPTRZ OPH DX IMG PST SGM RTA: CPT | Performed by: STUDENT IN AN ORGANIZED HEALTH CARE EDUCATION/TRAINING PROGRAM

## 2025-05-20 ASSESSMENT — INTRAOCULAR PRESSURE
OD: 22
OS: 19

## 2025-08-12 ENCOUNTER — OFFICE VISIT (OUTPATIENT)
Facility: LOCATION | Age: 75
End: 2025-08-12
Payer: MEDICARE

## 2025-08-12 DIAGNOSIS — E11.3313 TYPE 2 DIABETES MELLITUS WITH MODERATE NONPROLIFERATIVE DIABETIC RETINOPATHY WITH MACULAR EDEMA, BILATERAL: Primary | ICD-10-CM

## 2025-08-12 PROCEDURE — 67028 INJECTION EYE DRUG: CPT | Performed by: STUDENT IN AN ORGANIZED HEALTH CARE EDUCATION/TRAINING PROGRAM

## 2025-08-12 PROCEDURE — 92134 CPTRZ OPH DX IMG PST SGM RTA: CPT | Performed by: STUDENT IN AN ORGANIZED HEALTH CARE EDUCATION/TRAINING PROGRAM

## 2025-08-12 ASSESSMENT — INTRAOCULAR PRESSURE
OS: 17
OD: 14

## 2025-08-21 ENCOUNTER — OFFICE VISIT (OUTPATIENT)
Dept: URBAN - METROPOLITAN AREA CLINIC 71 | Facility: CLINIC | Age: 75
End: 2025-08-21
Payer: MEDICARE

## 2025-08-21 DIAGNOSIS — Z96.1 PRESENCE OF INTRAOCULAR LENS: ICD-10-CM

## 2025-08-21 DIAGNOSIS — H26.491 OTHER SECONDARY CATARACT, RIGHT EYE: ICD-10-CM

## 2025-08-21 DIAGNOSIS — E11.3313 TYPE 2 DIAB WITH MODERATE NONP RTNOP WITH MACULAR EDEMA, BI: Primary | ICD-10-CM

## 2025-08-21 DIAGNOSIS — H16.142 PUNCTATE KERATITIS, LEFT EYE: ICD-10-CM

## 2025-08-21 PROCEDURE — 99213 OFFICE O/P EST LOW 20 MIN: CPT

## 2025-08-21 PROCEDURE — 92133 CPTRZD OPH DX IMG PST SGM ON: CPT

## 2025-08-21 PROCEDURE — 92134 CPTRZ OPH DX IMG PST SGM RTA: CPT

## 2025-08-21 ASSESSMENT — INTRAOCULAR PRESSURE
OS: 12
OD: 14